# Patient Record
Sex: MALE | Race: WHITE | NOT HISPANIC OR LATINO
[De-identification: names, ages, dates, MRNs, and addresses within clinical notes are randomized per-mention and may not be internally consistent; named-entity substitution may affect disease eponyms.]

---

## 2017-02-06 PROBLEM — Z00.00 ENCOUNTER FOR PREVENTIVE HEALTH EXAMINATION: Status: ACTIVE | Noted: 2017-02-06

## 2018-01-05 ENCOUNTER — APPOINTMENT (OUTPATIENT)
Dept: SPEECH THERAPY | Facility: CLINIC | Age: 83
End: 2018-01-05

## 2018-01-05 ENCOUNTER — OUTPATIENT (OUTPATIENT)
Dept: OUTPATIENT SERVICES | Facility: HOSPITAL | Age: 83
LOS: 1 days | Discharge: HOME | End: 2018-01-05

## 2018-02-13 DIAGNOSIS — H90.3 SENSORINEURAL HEARING LOSS, BILATERAL: ICD-10-CM

## 2018-02-16 ENCOUNTER — APPOINTMENT (OUTPATIENT)
Dept: SPEECH THERAPY | Facility: CLINIC | Age: 83
End: 2018-02-16

## 2018-03-23 ENCOUNTER — APPOINTMENT (OUTPATIENT)
Dept: SPEECH THERAPY | Facility: CLINIC | Age: 83
End: 2018-03-23

## 2019-10-26 ENCOUNTER — EMERGENCY (EMERGENCY)
Facility: HOSPITAL | Age: 84
LOS: 1 days | Discharge: HOME | End: 2019-10-26
Attending: EMERGENCY MEDICINE
Payer: MEDICARE

## 2019-10-26 VITALS
HEIGHT: 67 IN | RESPIRATION RATE: 18 BRPM | SYSTOLIC BLOOD PRESSURE: 166 MMHG | HEART RATE: 66 BPM | DIASTOLIC BLOOD PRESSURE: 78 MMHG | WEIGHT: 151.9 LBS | TEMPERATURE: 98 F | OXYGEN SATURATION: 98 %

## 2019-10-26 PROCEDURE — 30903 CONTROL OF NOSEBLEED: CPT

## 2019-10-26 PROCEDURE — 99282 EMERGENCY DEPT VISIT SF MDM: CPT | Mod: 25

## 2019-10-26 PROCEDURE — 30903 CONTROL OF NOSEBLEED: CPT | Mod: LT

## 2019-10-26 PROCEDURE — 99283 EMERGENCY DEPT VISIT LOW MDM: CPT | Mod: 25

## 2019-10-27 ENCOUNTER — EMERGENCY (EMERGENCY)
Facility: HOSPITAL | Age: 84
LOS: 1 days | Discharge: HOME | End: 2019-10-27
Attending: EMERGENCY MEDICINE
Payer: MEDICARE

## 2019-10-27 VITALS
HEART RATE: 67 BPM | RESPIRATION RATE: 18 BRPM | TEMPERATURE: 98 F | DIASTOLIC BLOOD PRESSURE: 69 MMHG | HEIGHT: 67 IN | SYSTOLIC BLOOD PRESSURE: 141 MMHG | WEIGHT: 154.98 LBS | OXYGEN SATURATION: 97 %

## 2019-10-27 VITALS
DIASTOLIC BLOOD PRESSURE: 65 MMHG | SYSTOLIC BLOOD PRESSURE: 130 MMHG | OXYGEN SATURATION: 98 % | RESPIRATION RATE: 17 BRPM | TEMPERATURE: 97 F | HEART RATE: 64 BPM

## 2019-10-27 VITALS
DIASTOLIC BLOOD PRESSURE: 69 MMHG | OXYGEN SATURATION: 99 % | HEART RATE: 78 BPM | RESPIRATION RATE: 16 BRPM | SYSTOLIC BLOOD PRESSURE: 117 MMHG | TEMPERATURE: 98 F

## 2019-10-27 DIAGNOSIS — R04.0 EPISTAXIS: ICD-10-CM

## 2019-10-27 DIAGNOSIS — Z95.1 PRESENCE OF AORTOCORONARY BYPASS GRAFT: Chronic | ICD-10-CM

## 2019-10-27 LAB
APTT BLD: 29.5 SEC — SIGNIFICANT CHANGE UP (ref 27.5–36.3)
HCT VFR BLD CALC: 42.2 % — SIGNIFICANT CHANGE UP (ref 39–50)
HCT VFR BLD CALC: 44.3 % — SIGNIFICANT CHANGE UP (ref 39–50)
HGB BLD-MCNC: 14.4 G/DL — SIGNIFICANT CHANGE UP (ref 13–17)
HGB BLD-MCNC: 14.5 G/DL — SIGNIFICANT CHANGE UP (ref 13–17)
INR BLD: 0.97 RATIO — SIGNIFICANT CHANGE UP (ref 0.88–1.16)
MCHC RBC-ENTMCNC: 29 PG — SIGNIFICANT CHANGE UP (ref 27–34)
MCHC RBC-ENTMCNC: 30.2 PG — SIGNIFICANT CHANGE UP (ref 27–34)
MCHC RBC-ENTMCNC: 32.7 GM/DL — SIGNIFICANT CHANGE UP (ref 32–36)
MCHC RBC-ENTMCNC: 34.1 GM/DL — SIGNIFICANT CHANGE UP (ref 32–36)
MCV RBC AUTO: 88.5 FL — SIGNIFICANT CHANGE UP (ref 80–100)
MCV RBC AUTO: 88.6 FL — SIGNIFICANT CHANGE UP (ref 80–100)
NRBC # BLD: 0 /100 WBCS — SIGNIFICANT CHANGE UP (ref 0–0)
NRBC # BLD: 0 /100 WBCS — SIGNIFICANT CHANGE UP (ref 0–0)
PLATELET # BLD AUTO: 234 K/UL — SIGNIFICANT CHANGE UP (ref 150–400)
PLATELET # BLD AUTO: 256 K/UL — SIGNIFICANT CHANGE UP (ref 150–400)
PROTHROM AB SERPL-ACNC: 11 SEC — SIGNIFICANT CHANGE UP (ref 10–12.9)
RBC # BLD: 4.77 M/UL — SIGNIFICANT CHANGE UP (ref 4.2–5.8)
RBC # BLD: 5 M/UL — SIGNIFICANT CHANGE UP (ref 4.2–5.8)
RBC # FLD: 13.1 % — SIGNIFICANT CHANGE UP (ref 10.3–14.5)
RBC # FLD: 13.3 % — SIGNIFICANT CHANGE UP (ref 10.3–14.5)
WBC # BLD: 8.31 K/UL — SIGNIFICANT CHANGE UP (ref 3.8–10.5)
WBC # BLD: 9.31 K/UL — SIGNIFICANT CHANGE UP (ref 3.8–10.5)
WBC # FLD AUTO: 8.31 K/UL — SIGNIFICANT CHANGE UP (ref 3.8–10.5)
WBC # FLD AUTO: 9.31 K/UL — SIGNIFICANT CHANGE UP (ref 3.8–10.5)

## 2019-10-27 PROCEDURE — 85730 THROMBOPLASTIN TIME PARTIAL: CPT

## 2019-10-27 PROCEDURE — 85027 COMPLETE CBC AUTOMATED: CPT

## 2019-10-27 PROCEDURE — 30903 CONTROL OF NOSEBLEED: CPT

## 2019-10-27 PROCEDURE — 99284 EMERGENCY DEPT VISIT MOD MDM: CPT | Mod: 25

## 2019-10-27 PROCEDURE — 99284 EMERGENCY DEPT VISIT MOD MDM: CPT | Mod: GC

## 2019-10-27 PROCEDURE — 30901 CONTROL OF NOSEBLEED: CPT

## 2019-10-27 PROCEDURE — 30903 CONTROL OF NOSEBLEED: CPT | Mod: LT

## 2019-10-27 PROCEDURE — 99283 EMERGENCY DEPT VISIT LOW MDM: CPT | Mod: 25

## 2019-10-27 PROCEDURE — 85610 PROTHROMBIN TIME: CPT

## 2019-10-27 PROCEDURE — 99283 EMERGENCY DEPT VISIT LOW MDM: CPT

## 2019-10-27 RX ORDER — CEPHALEXIN 500 MG
1 CAPSULE ORAL
Qty: 20 | Refills: 0
Start: 2019-10-27 | End: 2019-11-05

## 2019-10-27 RX ORDER — CEPHALEXIN 500 MG
500 CAPSULE ORAL ONCE
Refills: 0 | Status: COMPLETED | OUTPATIENT
Start: 2019-10-27 | End: 2019-10-27

## 2019-10-27 RX ADMIN — Medication 500 MILLIGRAM(S): at 12:05

## 2019-10-27 NOTE — ED PROVIDER NOTE - OBJECTIVE STATEMENT
87yo M pmh quadruple bypass on ASA, unspecified arrythmia, HLD, HTN, w annual epistaxes (once requiring cauterization) presents for evaluation of L sided epistaxis intermittently x 2 days. No trauma. No other abnormal bleeding. No black stool.

## 2019-10-27 NOTE — ED PROVIDER NOTE - PATIENT PORTAL LINK FT
You can access the FollowMyHealth Patient Portal offered by [facility] by registering at the following website: [email or web address]. By joining [employer]’s FollowMyHealth portal, you will also be able to view your health information using other applications (apps) compatible with our system.

## 2019-10-27 NOTE — ED PROVIDER NOTE - NSFOLLOWUPCLINICS_GEN_ALL_ED_FT
[facility] - ENT  Otolaryngology (ENT)  [address] [place] [address]  Phone: [phone]  Fax:   Follow Up Time:

## 2019-10-27 NOTE — ED PROVIDER NOTE - NSFOLLOWUPCLINICS_GEN_ALL_ED_FT
[facility] - ENT  Otolaryngology (ENT)  [address] [place] [address]  Phone: [phone]  Fax:   Follow Up Time:

## 2019-10-27 NOTE — CONSULT NOTE ADULT - SUBJECTIVE AND OBJECTIVE BOX
CC: Epistaxis     HPI: 86y M hx CAD on ASA now returning to ED for the third time for epistaxis. Pt packed with 5.5cm anterior last night by the ED, returned this morning and ED removed RR and replaced it with a 7.5 and inflated about 5 cc. and merocel anteriorly. Patient was then discharged. Patient returns to the ED now with bleeding. Merocel no longer in place. ED inflated and additional 1.5cc into the balloon. Patient with a total of now 7.5cc in the balloon. Patient was reinforced with Surgicel anteriorly with some minor oozing. Denies dripping in the back of the throat, SOB      PAST MEDICAL & SURGICAL HISTORY:  Nosebleed  Prostate CA  HLD (hyperlipidemia)  HTN (hypertension)  S/P CABG (coronary artery bypass graft)    Allergies    No Known Allergies    Intolerances      MEDICATIONS  (STANDING):    MEDICATIONS  (PRN):      Social History: no tobacco, no etoh       Family history: Pt denies any sign FHx    ROS:   ENT: all negative except as noted in HPI   CV: denies palpitations  Pulm: denies SOB, cough, hemoptysis  GI: denies change in apetite, indigestion, n/v  : denies pertinent urinary symptoms, urgency  Neuro: denies numbness/tingling, loss of sensation  Psych: denies anxiety  MS: denies muscle weakness, instability  Heme: denies easy bruising or bleeding  Endo: denies heat/cold intolerance, excessive sweating  Vascular: denies LE edema    Vital Signs Last 24 Hrs  T(C): 36.3 (27 Oct 2019 21:12), Max: 36.8 (27 Oct 2019 20:38)  T(F): 97.3 (27 Oct 2019 21:12), Max: 98.3 (27 Oct 2019 20:38)  HR: 64 (27 Oct 2019 21:12) (64 - 78)  BP: 130/65 (27 Oct 2019 21:12) (117/69 - 166/78)  BP(mean): --  RR: 17 (27 Oct 2019 21:12) (16 - 18)  SpO2: 98% (27 Oct 2019 21:12) (97% - 99%)                          14.4   8.31  )-----------( 234      ( 27 Oct 2019 12:06 )             42.2         PHYSICAL EXAM:  Gen: NAD  Skin: No rashes, bruises, or lesions  Head: Normocephalic, Atraumatic  Face: no edema, erythema, or fluctuance. Parotid glands soft without mass  Eyes: no scleral injection  Nose: Left nose with Rapid Rhino inflated with 7.5 cc of air, reinforced with surgicel   Mouth: No Stridor / Drooling / Trismus.  Mucosa moist, tongue/uvula midline, oropharynx clear  Neck: Flat, supple, no lymphadenopathy, trachea midline, no masses  Lymphatic: No lymphadenopathy  Resp: breathing easily, no stridor  CV: no peripheral edema/cyanosis  GI: nondistended   Peripheral vascular: no JVD or edema  Neuro: facial nerve intact, no facial droop

## 2019-10-27 NOTE — ED PROVIDER NOTE - OBJECTIVE STATEMENT
86y M hx CAD on ASA here for bleeding through rhino rocket packing which was placed yesterday. Pt packed with 5.5cm anterior but states that he has had bleeding both anteriorly and posteriorly through the night. No other complaints. No dizziness, cp, palp, sob, fuller, nv. 86y M hx CAD on ASA here for bleeding through rhino rocket packing which was placed early this morning. Pt packed with 5.5cm anterior but states that he has had bleeding both anteriorly and posteriorly through the night. No other complaints. No dizziness, cp, palp, sob, fuller, nv.

## 2019-10-27 NOTE — ED PROVIDER NOTE - PROGRESS NOTE DETAILS
Allen Allen, PGY-2: patient does not want to stay, no active bleeding at this time. Hemoglobin is stable, has information for ENT follow up from previous visits. Will dischage.

## 2019-10-27 NOTE — CONSULT NOTE ADULT - PROBLEM SELECTOR RECOMMENDATION 9
- CDU for observation   - gram-positive abx coverage for duration of packing placement  - Continue with Nasal Packing (Rapid Rhino)  - Strict blood pressure control.  - Nasal saline, 2 sprays to both nares 4 times a day  - Avoid nasal trauma; no nose rubbing, blowing or manipulating nasal packing.  Sneeze with mouth open and pinching nares.  - Avoid bending with head below the waist.    - No heavy lifting.

## 2019-10-27 NOTE — ED PROVIDER NOTE - NSFOLLOWUPINSTRUCTIONS_ED_ALL_ED_FT
Please follow up with ENT, call on Monday to schedule an appointment as soon as possible.    Return to the emergency department if you worsening bleeding, fatigue, dizziness, shortness of breath or chest pain.    Leave the nasal packing in place until removed by ENT.

## 2019-10-27 NOTE — ED ADULT NURSE NOTE - OBJECTIVE STATEMENT
87 y/o male taking aspirin bib son with c/o epistaxis 85 y/o male with pmhx of recurrent epistaxis taking aspirin bib son with c/o epistaxis.  no airway compromise noted.  no sob or respiratory distress.  vss.  safety precautions in place.  son at bedside.

## 2019-10-27 NOTE — ED PROVIDER NOTE - PATIENT PORTAL LINK FT
You can access the FollowMyHealth Patient Portal offered by [facility] by registering at the following website: [email or web address]. By joining [employer]’s FollowMyHealth portal, you will also be able to view your health information using other applications (apps) compatible with our system.

## 2019-10-27 NOTE — ED PROVIDER NOTE - CARE PLAN
Assessment and plan of treatment:	Epistaxis  -anterior nasal packin  -f/u w ENT in 2 days Principal Discharge DX:	Nosebleed  Assessment and plan of treatment:	Epistaxis  -anterior nasal packin  -f/u w ENT in 2 days

## 2019-10-27 NOTE — ED ADULT NURSE NOTE - NSIMPLEMENTINTERV_GEN_ALL_ED
Implemented All Fall with Harm Risk Interventions:  [place] to call system. Call bell, personal items and telephone within reach. Instruct patient to call for assistance. Room bathroom lighting operational. Non-slip footwear when patient is off stretcher. Physically safe environment: no spills, clutter or unnecessary equipment. Stretcher in lowest position, wheels locked, appropriate side rails in place. Provide visual cue, wrist band, yellow gown, etc. Monitor gait and stability. Monitor for mental status changes and reorient to person, place, and time. Review medications for side effects contributing to fall risk. Reinforce activity limits and safety measures with patient and family. Provide visual clues: red socks.

## 2019-10-27 NOTE — ED PROVIDER NOTE - PHYSICAL EXAMINATION
Gen: WNWD NAD  HEENT: NCAT  EOMI normal pharynx L nare packed w rhino, rhino removed and bleeding from L nare, unable to visualize source   Neck: supple  CV: RRR, no murmur  Lung: CTA BL  Ext: wwp, coulter   Neuro: Awake alert CN grossly intact

## 2019-10-27 NOTE — ED PROVIDER NOTE - OBJECTIVE STATEMENT
86y male with PMH of CAD s/p CABG, HTN presenting with a nosebleed. This is his third visit today, has a 7.5 rhinorocket in place inflated with 5cc. Bleeding around packing started around 7pm, briefly resolved, but became worse when in triage. No chest pain, 86y male with PMH of CAD s/p CABG, HTN presenting with a nosebleed. This is his third visit today, has a 7.5 rhinorocket in place inflated with 5cc. Bleeding around packing started around 7pm, briefly resolved, but became worse when in triage. No chest pain, dizziness, SOB, weakness, fatigue.

## 2019-10-27 NOTE — CONSULT NOTE ADULT - ASSESSMENT
86y M hx CAD on ASA now returning to ED for the third time for epistaxis. Currently with a posterior 7.5 rapid rhino inflated with 7.5cc of air reinforced with surgicel. Currently not actively bleeding. Since this is the third time patient is coming to the ED we will recommend overnight observation.

## 2019-10-27 NOTE — CONSULT NOTE ADULT - SUBJECTIVE AND OBJECTIVE BOX
CC: L epistaxis     HPI:   86y M hx CAD on ASA here for bleeding through rhino rocket packing which was placed early this morning. Pt packed with 5.5cm anterior but states that he has had bleeding both anteriorly and posteriorly through the night. No other complaints. No dizziness, cp, palp, sob, fuller, nv. ED removed RR and replaced with 7.5 and inflated about 5 cc. and merocel anterior, no signs of active bleeding at time of seeing pt.     PAST MEDICAL & SURGICAL HISTORY:  HLD (hyperlipidemia)  HTN (hypertension)  S/P CABG (coronary artery bypass graft)    Allergies    No Known Allergies    Intolerances      MEDICATIONS  (STANDING):    MEDICATIONS  (PRN):      Social History: no tobacco, no etoh     Family history: Pt denies any sign FHx    ROS:   ENT: all negative except as noted in HPI   CV: denies palpitations  Pulm: denies SOB, cough, hemoptysis  GI: denies change in apetite, indigestion, n/v  : denies pertinent urinary symptoms, urgency  Neuro: denies numbness/tingling, loss of sensation  Psych: denies anxiety  MS: denies muscle weakness, instability  Heme: denies easy bruising or bleeding  Endo: denies heat/cold intolerance, excessive sweating  Vascular: denies LE edema    Vital Signs Last 24 Hrs  T(C): 36.5 (27 Oct 2019 10:29), Max: 36.5 (26 Oct 2019 23:24)  T(F): 97.7 (27 Oct 2019 10:29), Max: 97.7 (26 Oct 2019 23:24)  HR: 78 (27 Oct 2019 10:29) (66 - 78)  BP: 117/69 (27 Oct 2019 10:29) (117/69 - 166/78)  BP(mean): --  RR: 16 (27 Oct 2019 10:29) (16 - 18)  SpO2: 99% (27 Oct 2019 10:29) (98% - 99%)                          14.4   8.31  )-----------( 234      ( 27 Oct 2019 12:06 )             42.2             PHYSICAL EXAM:  Gen: NAD  Skin: No rashes, bruises, or lesions  Head: Normocephalic, Atraumatic  Face: no edema, erythema, or fluctuance. Parotid glands soft without mass  Eyes: no scleral injection  Nose: Left nare with 7.5 RR w 5cc of saline, and folder merocel anteriorly, which was removed and replaced with nasopore packin, no active bleeding.   Mouth: No Stridor / Drooling / Trismus.  Mucosa moist, tongue/uvula midline, oropharynx clear, no signs of bleeding  Neck: Flat, supple, no lymphadenopathy, trachea midline, no masses  Lymphatic: No lymphadenopathy  Resp: breathing easily, no stridor  CV: no peripheral edema/cyanosis  GI: nondistended   Peripheral vascular: no JVD or edema  Neuro: facial nerve intact, no facial droop          IMAGING/ADDITIONAL STUDIES:

## 2019-10-27 NOTE — CONSULT NOTE ADULT - PROBLEM SELECTOR RECOMMENDATION 9
- gram-positive abx coverage for duration of packing placement  - Continue with Nasal Packing (Rapid Rhino)  - Strict blood pressure control.  - Nasal saline, 2 sprays to both nares 4 times a day  - Avoid nasal trauma; no nose rubbing, blowing or manipulating nasal packing.  Sneeze with mouth open and pinching nares.  - Avoid bending with head blow the waist.    -No heavy lifting.

## 2019-10-27 NOTE — ED PROVIDER NOTE - NSFOLLOWUPINSTRUCTIONS_ED_ALL_ED_FT
You were seen in the Emergency Department for nose bleed.  1) Advance activity as tolerated.    2) Continue all previously prescribed medications as directed.    3) Please follow up with ENT this week. I have attached the contact above. You should also receive a call from our follow up coordinator within the next 2 days to help schedule your appointment.  4) Return to the Emergency Department for worsening or persistent symptoms including rebleeding, chills, fever, and/or ANY NEW OR CONCERNING SYMPTOMS. If you have issues obtaining follow up, please call: [phone] to obtain a doctor or specialist who takes your insurance in your area.

## 2019-10-27 NOTE — ED ADULT NURSE NOTE - NSIMPLEMENTINTERV_GEN_ALL_ED
Implemented All Fall with Harm Risk Interventions:  [place] to call system. Call bell, personal items and telephone within reach. Instruct patient to call for assistance. Room bathroom lighting operational. Non-slip footwear when patient is off stretcher. Physically safe environment: no spills, clutter or unnecessary equipment. Stretcher in lowest position, wheels locked, appropriate side rails in place. Provide visual cue, wrist band, yellow gown, etc. Monitor gait and stability. Monitor for mental status changes and reorient to person, place, and time. Review medications for side effects contributing to fall risk. Reinforce activity limits and safety measures with patient and family. Provide visual clues: red socks.

## 2019-10-27 NOTE — ED PROVIDER NOTE - PROGRESS NOTE DETAILS
Seen by ENT, nasopore placed anteriorly, hemostasis achieved. Obs x2 hours. , Joseph Frankel PGY1: Patient no longer bleeding. Observation complete. Will dc ENT with follow up. Discussed plan and return precautions with patient and family who understand and agree.

## 2019-10-27 NOTE — ED PROVIDER NOTE - CARE PROVIDER_API CALL
Dayo Lange)  Otolaryngology  [address] [place] [address], [address]  [place] [address]  Phone: [phone]  Fax: [phone]  Follow Up Time:

## 2019-10-27 NOTE — ED PROVIDER NOTE - ATTENDING CONTRIBUTION TO CARE
Patient presenting with epistaxis.  Now 3rd visit for same.  Seen by myself last night, bleeding from L nare, on baby ASA.  5.5cm rhino placed and inflated with control of bleeding.  Had recurrent oozing around rhino, presented earlier this morning, ENT evaluated, CBC unremarkable, 5.5cm changed to 7.5cm, observed in Emergency Department for 2 hours with no bleeding and discharged.  Returning tonight for recurrent oozing around rhino with some bleeding down back of throat.  No other new symptoms.    Exam:  General: Patient well appearing, vital signs within normal limits  HEENT: airway patent with moist mucous membranes, L nare rhino rocket in place without active bleeding around rhino or in oropharynx  Cardiac: RRR S1/S2 with strong peripheral pulses  Respiratory: lungs clear without respiratory distress  Neuro: no gross neurologic deficits  Skin: warm, well perfused  Psych: normal mood and affect    1.5 cc of air added to rhino for increased pressure, no active bleeding appreciated while in Emergency Department.  ENT consulted in Emergency Department, recommending CDU for obs given patients recurring small bleeding for continued management as needed.  Labs without change in hemoglobin and normal coags.  CDU evaluated and accepted patient, however patient unwilling to stay for observation, reporting will follow up with ENT as outpatient.  Will discharge given patient unwilling to stay for observation, however suspect high likelihood of repeat Emergency Department visit for same give course to date.

## 2019-10-27 NOTE — ED PROVIDER NOTE - PROGRESS NOTE DETAILS
Anterior packing placed by Dr Jaleel earl hemostasis. Plan to fmonet earl ENT in 2 days. Plan for discharge discussed with patient. All questions answered. Pt verbalizes agreement and understanding of plan. -Opal Neves PA-C

## 2019-10-27 NOTE — ED PROVIDER NOTE - CLINICAL SUMMARY MEDICAL DECISION MAKING FREE TEXT BOX
86y M on ASA here for recurrent epistaxis despite packing yesterday. Will remove current packing and repack, if hemostasis is not achieved will consult ENT. Check CBC given signif bleeding.

## 2019-10-27 NOTE — ED PROVIDER NOTE - ATTENDING CONTRIBUTION TO CARE
Patient presenting with nose bleed.  Began earlier this evening.  Unable to control at home.  On aspirin, no other blood thinners.  History of occasional nose bleeds in past.  No injuries.    Exam:  General: Patient well appearing, mildly hypertensive otherwise vital signs within normal limits  HEENT: bleeding with clot present in L nare  Neuro: no gross neurologic deficits  Skin: warm, well perfused  Psych: normal mood and affect    Clot evacuated, unable to directly visualize source of bleeding, rhinorocket placed with immediate control, will monitor in Emergency Department for further bleeding and likely DC with ENT follow up with rhino in place.

## 2019-10-27 NOTE — CONSULT NOTE ADULT - ASSESSMENT
86y with Left epistaxis controled with 7.5 RR and nasopore packing anteriorly, no bleeding in posterior pharynx.

## 2019-10-27 NOTE — ED PROVIDER NOTE - CLINICAL SUMMARY MEDICAL DECISION MAKING FREE TEXT BOX
80y male with epistaxis. Refractory to treatment with rhino rocket. Does not appear to be actively bleeding a this time. Will consult ENT, will call CDU for obs.

## 2019-10-27 NOTE — ED PROVIDER NOTE - PATIENT PORTAL LINK FT
You can access the FollowMyHealth Patient Portal offered by [facility] by registering at the following website: [email or web address]. By joining [employer]’s FollowMyHealth portal, you will also be able to view your health information using other applications (apps) compatible with our system.

## 2019-10-28 ENCOUNTER — EMERGENCY (EMERGENCY)
Facility: HOSPITAL | Age: 84
LOS: 1 days | Discharge: HOME | End: 2019-10-28
Attending: PERSONAL EMERGENCY RESPONSE ATTENDANT
Payer: MEDICARE

## 2019-10-28 VITALS
RESPIRATION RATE: 17 BRPM | HEART RATE: 76 BPM | OXYGEN SATURATION: 96 % | SYSTOLIC BLOOD PRESSURE: 104 MMHG | DIASTOLIC BLOOD PRESSURE: 69 MMHG | TEMPERATURE: 98 F

## 2019-10-28 VITALS
HEART RATE: 77 BPM | OXYGEN SATURATION: 98 % | TEMPERATURE: 98 F | WEIGHT: 154.98 LBS | HEIGHT: 67 IN | SYSTOLIC BLOOD PRESSURE: 145 MMHG | DIASTOLIC BLOOD PRESSURE: 76 MMHG | RESPIRATION RATE: 18 BRPM

## 2019-10-28 DIAGNOSIS — R04.0 EPISTAXIS: ICD-10-CM

## 2019-10-28 DIAGNOSIS — Z95.1 PRESENCE OF AORTOCORONARY BYPASS GRAFT: Chronic | ICD-10-CM

## 2019-10-28 PROBLEM — I10 ESSENTIAL (PRIMARY) HYPERTENSION: Chronic | Status: ACTIVE | Noted: 2019-10-27

## 2019-10-28 PROBLEM — E78.5 HYPERLIPIDEMIA, UNSPECIFIED: Chronic | Status: ACTIVE | Noted: 2019-10-27

## 2019-10-28 LAB
HCT VFR BLD CALC: 43.8 % — SIGNIFICANT CHANGE UP (ref 39–50)
HGB BLD-MCNC: 14.7 G/DL — SIGNIFICANT CHANGE UP (ref 13–17)
MCHC RBC-ENTMCNC: 29.3 PG — SIGNIFICANT CHANGE UP (ref 27–34)
MCHC RBC-ENTMCNC: 33.6 GM/DL — SIGNIFICANT CHANGE UP (ref 32–36)
MCV RBC AUTO: 87.3 FL — SIGNIFICANT CHANGE UP (ref 80–100)
NRBC # BLD: 0 /100 WBCS — SIGNIFICANT CHANGE UP (ref 0–0)
PLATELET # BLD AUTO: 244 K/UL — SIGNIFICANT CHANGE UP (ref 150–400)
RBC # BLD: 5.02 M/UL — SIGNIFICANT CHANGE UP (ref 4.2–5.8)
RBC # FLD: 13.2 % — SIGNIFICANT CHANGE UP (ref 10.3–14.5)
WBC # BLD: 11.38 K/UL — HIGH (ref 3.8–10.5)
WBC # FLD AUTO: 11.38 K/UL — HIGH (ref 3.8–10.5)

## 2019-10-28 PROCEDURE — 30903 CONTROL OF NOSEBLEED: CPT | Mod: LT

## 2019-10-28 PROCEDURE — G0378: CPT

## 2019-10-28 PROCEDURE — 99234 HOSP IP/OBS SM DT SF/LOW 45: CPT | Mod: GC

## 2019-10-28 PROCEDURE — 85027 COMPLETE CBC AUTOMATED: CPT

## 2019-10-28 PROCEDURE — 99284 EMERGENCY DEPT VISIT MOD MDM: CPT | Mod: 25

## 2019-10-28 PROCEDURE — 96374 THER/PROPH/DIAG INJ IV PUSH: CPT | Mod: XU

## 2019-10-28 PROCEDURE — 30901 CONTROL OF NOSEBLEED: CPT | Mod: LT

## 2019-10-28 RX ORDER — MORPHINE SULFATE 50 MG/1
4 CAPSULE, EXTENDED RELEASE ORAL ONCE
Refills: 0 | Status: DISCONTINUED | OUTPATIENT
Start: 2019-10-28 | End: 2019-10-28

## 2019-10-28 RX ORDER — AMLODIPINE BESYLATE 2.5 MG/1
5 TABLET ORAL ONCE
Refills: 0 | Status: COMPLETED | OUTPATIENT
Start: 2019-10-28 | End: 2019-10-28

## 2019-10-28 RX ORDER — METOPROLOL TARTRATE 50 MG
25 TABLET ORAL DAILY
Refills: 0 | Status: DISCONTINUED | OUTPATIENT
Start: 2019-10-28 | End: 2019-11-04

## 2019-10-28 RX ORDER — LISINOPRIL 2.5 MG/1
40 TABLET ORAL DAILY
Refills: 0 | Status: DISCONTINUED | OUTPATIENT
Start: 2019-10-28 | End: 2019-11-04

## 2019-10-28 RX ORDER — LEVOTHYROXINE SODIUM 125 MCG
125 TABLET ORAL DAILY
Refills: 0 | Status: DISCONTINUED | OUTPATIENT
Start: 2019-10-28 | End: 2019-11-04

## 2019-10-28 RX ORDER — MORPHINE SULFATE 50 MG/1
2 CAPSULE, EXTENDED RELEASE ORAL ONCE
Refills: 0 | Status: DISCONTINUED | OUTPATIENT
Start: 2019-10-28 | End: 2019-10-28

## 2019-10-28 RX ORDER — SODIUM CHLORIDE 9 MG/ML
3 INJECTION INTRAMUSCULAR; INTRAVENOUS; SUBCUTANEOUS EVERY 8 HOURS
Refills: 0 | Status: DISCONTINUED | OUTPATIENT
Start: 2019-10-28 | End: 2019-11-04

## 2019-10-28 RX ADMIN — SODIUM CHLORIDE 3 MILLILITER(S): 9 INJECTION INTRAMUSCULAR; INTRAVENOUS; SUBCUTANEOUS at 16:27

## 2019-10-28 RX ADMIN — MORPHINE SULFATE 2 MILLIGRAM(S): 50 CAPSULE, EXTENDED RELEASE ORAL at 13:25

## 2019-10-28 RX ADMIN — MORPHINE SULFATE 2 MILLIGRAM(S): 50 CAPSULE, EXTENDED RELEASE ORAL at 12:53

## 2019-10-28 NOTE — ED CDU PROVIDER DISPOSITION NOTE - PATIENT PORTAL LINK FT
You can access the FollowMyHealth Patient Portal offered by [facility] by registering at the following website: [email or web address]. By joining [employer]’s FollowMyHealth portal, you will also be able to view your health information using other applications (apps) compatible with our system.

## 2019-10-28 NOTE — ED CDU PROVIDER INITIAL DAY NOTE - OBJECTIVE STATEMENT
86y male with PMH of CAD s/p CABG, HTN presenting with a nosebleed. This is his fourth visit today, has a 7.5 rhinorocket in place inflated with 7cc. Bleeding around packing started around 2:30am, bleeding has stopped now. No chest pain, dizziness, SOB, weakness, fatigue. On daily asa 81.  In ED ENT evaluated and placed rhinorocket again. Discussed with ENT plan for reassessing rhinorocket frequently throughout CDU visit, as H/H stable at this time, patient not requiring admission.

## 2019-10-28 NOTE — ED PROVIDER NOTE - PHYSICAL EXAMINATION
Head: NCAT  HEENT: Dried blood on nose and chin, no blood seeping around RR in left nares, clotted blood in the left nares, no blood in right nares, mild blood streak in oropharynx, oral mucosa moist, normal conjunctiva  Lung: CTAB, no respiratory distress, no wheezes/rhonchi/rales B/L, speaking in full sentences  CV: RRR, no murmurs, rubs or gallops	  Skin: Warm, well perfused, no rash  Psych: normal affect.   ~Allen Tiffany PGY2

## 2019-10-28 NOTE — ED CDU PROVIDER DISPOSITION NOTE - NSFOLLOWUPINSTRUCTIONS_ED_ALL_ED_FT
Follow up with ENT out patient on 10/30 for packing removal at [address] [place] [address] call [phone] (Either Mike Arellano or Emerson)  Avoid nasal trauma; no nose rubbing, blowing or manipulating nasal packing.  Sneeze with mouth open and pinching nares. Avoid bending with head blow the waist. No heavy lifting.  Follow up with your Primary Care Physician within the next 2-3 days  Bring a copy of your test results with you to your appointment  Continue your current home medication regimen  Return to the Emergency Room if you experience new or worsening symptoms

## 2019-10-28 NOTE — ED ADULT NURSE REASSESSMENT NOTE - NS ED NURSE REASSESS COMMENT FT1
Pt discharged as per provider. Teaching provided by AIMEE Muniz. Pt verbalizes understanding to discharge orders & will follow up with PMD post discharge. IV lock removed. No bleeding noted. Pt stable upon discharge.

## 2019-10-28 NOTE — CONSULT NOTE ADULT - ATTENDING COMMENTS
patient would like to go home, has been observed for 12 hours with no further rebleeding.  F/up on thursday or friday for packing removal.

## 2019-10-28 NOTE — ED ADULT NURSE NOTE - OBJECTIVE STATEMENT
87 yo male from home A&OX4 daughter bedside c/o epitasis. Pt seen last night and DC with rhino rocket. PT st he is bleeding around device. Pt has small amount of dry blood under nostril, not actively bleeding. PT st he has small arterial bleed. Pt denies headache, lightheaded/dizziness. Pt denies all other complaints. VS stable, MD bedside for exam, CBC pending. ENT consult pending. 85 yo male from home A&OX4 daughter bedside c/o epistaxis. Pt seen last night and DC with rhino rocket. PT st he is bleeding around device. Pt has small amount of dry blood under nostril, not actively bleeding. PT st he has small arterial bleed. Pt denies headache, lightheaded/dizziness. Pt denies all other complaints. VS stable, MD bedside for exam, CBC pending. ENT consult pending.

## 2019-10-28 NOTE — ED CDU PROVIDER INITIAL DAY NOTE - DETAILS
87 y/o M p/w epistaxis   - continuous monitoring and frequent reevaluations   - observe for rebleeding   - ENT following   - d/w Dr. Reynolds

## 2019-10-28 NOTE — ED PROVIDER NOTE - ATTENDING CONTRIBUTION TO CARE
Attending MD Willoughby.  Agree with above.  Pt with recurrent visits however limited bleeding on exams.  Pt denies any secondary sxs - SOB, light-headedness, CP.  He had been offered observation stay on last visit but refused.  Rhinorocket in place, no active bleeding appreciated.  ENT aware of pt given repeat visits.  Had recommended no scoping/cauterization that might destabilize clot.  ENT reconsulted for pt comfort to reassure and reiterate that small volume oozing is not indication for concern.  Pt to be observed in Ed and ENT to see with likely plan to d/c if bleeding does not significantly recur.  Stable at time of signout to incoming team pending above.

## 2019-10-28 NOTE — ED CDU PROVIDER DISPOSITION NOTE - CARE PROVIDER_API CALL
Tierra Zamudio)  Otolaryngology  [address] [place] [address], [address]  [place] [address]  Phone: [phone]  Fax: [phone]  Follow Up Time:

## 2019-10-28 NOTE — ED ADULT NURSE REASSESSMENT NOTE - NS ED NURSE REASSESS COMMENT FT1
Pt is in bed, family at bedside. Physician Assistant is repacking left nostril. Several bloody gauze present. Will assess and monitor.

## 2019-10-28 NOTE — CONSULT NOTE ADULT - PROBLEM SELECTOR RECOMMENDATION 9
Observation for at least 12 hours  Pt to follow up with ENT out patient on 10/30 for packing removal at [facility] call [phone] (Either Mike Arellano or Emerson)  - gram-positive abx coverage for duration of packing placement  - Continue with Nasal Packing (Rapid Rhino)  - Strict blood pressure control.  - Nasal saline, 2 sprays to both nares 4 times a day  - Avoid nasal trauma; no nose rubbing, blowing or manipulating nasal packing.  Sneeze with mouth open and pinching nares.  - Avoid bending with head blow the waist.    -No heavy lifting.

## 2019-10-28 NOTE — CONSULT NOTE ADULT - SUBJECTIVE AND OBJECTIVE BOX
CC: Epistaxis    HPI: 86y M hx CAD on ASA returning to ED for epistaxis. Pt packed with 5.5cm anterior Saturday night by the ED, returned yesterday morning and ED removed RR and replaced it with a 7.5 and inflated about 5 cc. and merocel anteriorly. Patient was then discharged. Patient returned to the ED last night with bleeding. Merocel no longer in place. ED inflated an additional 1.5cc into the balloon. Patient was reinforced with Surgicel anteriorly with some minor oozing. Denies dripping in the back of the throat, SOB. Pt then went home and reported bleeding which started at about 2:30  this am. Pt still with 7.5 RR in L nare.  Pt with history of nosebeleed requiring packing and cauterization 15 years ago, had several minor self resolving bleeds since. Pt denies nasal trauma, headache, facial pain. HH 14.7/43.8 which has been stable.      PAST MEDICAL & SURGICAL HISTORY:  Nosebleed  Prostate CA  HLD (hyperlipidemia)  HTN (hypertension)  S/P CABG (coronary artery bypass graft)    Allergies    No Known Allergies    Intolerances      MEDICATIONS  (STANDING):  levothyroxine 125 MICROGram(s) Oral daily  lisinopril 40 milliGRAM(s) Oral daily  metoprolol succinate ER 25 milliGRAM(s) Oral daily  sodium chloride 0.9% lock flush 3 milliLiter(s) IV Push every 8 hours    MEDICATIONS  (PRN):      Social History: No reported toxic habits    Family history: No significant medical history in first degree relatives      ROS:   ENT: all negative except as noted in HPI   Skin: No itching, dryness, rash, changes to hair, or skin masses  CV: denies palpitations  Pulm: denies SOB, cough, hemoptysis  GI: denies change in appetite, indigestion, n/v  : denies pertinent urinary symptoms, urgency  Neuro: denies numbness/tingling, loss of sensation  Psych: denies anxiety  MS: denies muscle weakness, instability  Heme: denies easy bruising or bleeding  Endo: denies heat/cold intolerance, excessive sweating  Vascular: denies LE edema    Vital Signs Last 24 Hrs  T(C): 36.7 (28 Oct 2019 17:25), Max: 37 (28 Oct 2019 08:36)  T(F): 98.1 (28 Oct 2019 17:25), Max: 98.6 (28 Oct 2019 08:36)  HR: 76 (28 Oct 2019 17:25) (64 - 77)  BP: 104/69 (28 Oct 2019 17:25) (104/69 - 164/82)  BP(mean): --  RR: 17 (28 Oct 2019 17:25) (17 - 19)  SpO2: 96% (28 Oct 2019 17:25) (96% - 99%)                          14.7   11.38 )-----------( 244      ( 28 Oct 2019 08:20 )             43.8         PT/INR - ( 27 Oct 2019 22:23 )   PT: 11.0 sec;   INR: 0.97 ratio         PTT - ( 27 Oct 2019 22:23 )  PTT:29.5 sec    PHYSICAL EXAM:  Gen: NAD  Skin: No rashes, bruises, or lesions  Head: Normocephalic, Atraumatic  Face: no edema, erythema, or fluctuance. Parotid glands soft without mass  Eyes: no scleral injection  Nose: L nare with posterior rapid rhino, initially seen with active oozing of BRB  Mouth: No Stridor / Drooling / Trismus.  Mucosa moist, tongue/uvula midline, oropharynx with fresh blood streaking. No clots  Neck: Flat, supple, no lymphadenopathy, trachea midline, no masses  Lymphatic: No lymphadenopathy  Resp: breathing easily, no stridor  CV: no peripheral edema/cyanosis  GI: nondistended   Peripheral vascular: no JVD or edema  Neuro: facial nerve intact, no facial droop      Initial encounter around 8 am: Pt with brisk bleed despite packing. RR was deflated completely and moved slightly forward. L RR then deflated with about 12 ccs of air and packed anteriorly to hold pressure until bleed resolved. Oropharynx examined. Noted to have stagnant fresh blood which was suctioned. Pt then gargled numerous times until saliva clear and no active bleeding seen on reexamination.     About 2 hours later: Pt examined. No bleeding from b/l nares. Oropharynx clear.    Around 2pm: A few ccs of air taken from packing to make pt more comfortable. Anterior vaseline gauze hanging. Small portion snipped, the rest packed to reinforce anterior nare. Again oropharynx clear of blood.

## 2019-10-28 NOTE — CONSULT NOTE ADULT - ASSESSMENT
85 yo male w intermittent epistaxis from left nare for the past 2 days which has been slowly controlled, most presently with a 7.5 RR and anterior vaseline gauze. Pt has been followed up every about 2 hours by ENT in observation today where he has not had any bleeding anteriorly or posteriorly since the packing manipulation in the ED this am. HH has been stable.

## 2019-10-28 NOTE — ED CDU PROVIDER INITIAL DAY NOTE - PROGRESS NOTE DETAILS
ENT came to reevaluate patient; deflated rhinorocket balloon slightly and patient reports no difficulty swallowing at this time. No evidence of rebleeding again. Will come to observe again later this afternoon. -Brandy Velasco PA-C Patient has not rebled. Discussed with ENT who states will come back to reassess with attending Dr. Mckeon around 7pm. -Brandy Velasco PA-C CDU PROGRESS NOTE PA DEMETRICE: Pt resting comfortably, feeling well without complaint. NAD, VSS.  Rhinorocket in place. While in CDU, patient with no bleeding episodes, without complaints. Pt re-evaluated by ENT and cleared for dispo, advised to follow up 10/30 for packing removal and continue Keflex until removal. c/d/w Dr. Chel MD aware.

## 2019-10-28 NOTE — ED CDU PROVIDER INITIAL DAY NOTE - MEDICAL DECISION MAKING DETAILS
Attending MD Reynolds: 86y male with PMH of CAD s/p CABG, HTN presenting with a nosebleed. This is his fourth visit today.In ED ENT evaluated and placed rhinorocket again. Discussed with ENT plan for reassessing rhinorocket frequently throughout CDU visit, as H/H stable at this time, patient not requiring admission.  Will continue to monitor in CDU.

## 2019-10-28 NOTE — ED CDU PROVIDER INITIAL DAY NOTE - ATTENDING CONTRIBUTION TO CARE
Attending MD Reynolds:   I personally have seen and examined this patient.  Physician assistant note reviewed and agree on plan of care and except where noted.

## 2019-10-28 NOTE — ED CDU PROVIDER DISPOSITION NOTE - CLINICAL COURSE
86y male with PMH of CAD s/p CABG, HTN presenting with a nosebleed. This is his fourth visit today, has a 7.5 rhinorocket in place inflated with 7cc. Bleeding around packing started around 2:30am, bleeding has stopped now. No chest pain, dizziness, SOB, weakness, fatigue. On daily asa 81.  In ED ENT evaluated and placed rhinorocket again. Discussed with ENT plan for reassessing rhinorocket frequently throughout CDU visit, as H/H stable at this time, patient not requiring admission. 86y male with PMH of CAD s/p CABG, HTN presenting with a nosebleed. This is his fourth visit today, has a 7.5 rhinorocket in place inflated with 7cc. Bleeding around packing started around 2:30am, bleeding has stopped now. No chest pain, dizziness, SOB, weakness, fatigue. On daily asa 81.  In ED ENT evaluated and placed rhinorocket again. Discussed with ENT plan for reassessing rhinorocket frequently throughout CDU visit, as H/H stable at this time, patient not requiring admission. While in CDU, patient with no bleeding episodes, without complaints. Pt re-evaluated by ENT and cleared for dispo, advised to follow up 10/30 for packing removal and continue Keflex 500mg BID until removal. c/d/w Dr. Chel MD aware.

## 2019-10-28 NOTE — ED PROVIDER NOTE - CLINICAL SUMMARY MEDICAL DECISION MAKING FREE TEXT BOX
Altaf PGY-1: Patient here with reccurrent Altaf PGY-1: Patient here with recurrent nose bleed after frequent attempts at re-packing by ENT with discharge home. Patient registered to ED four time in past day. Patient on ASA. Hemodynamically stable. Consult ENT.

## 2019-10-28 NOTE — ED CDU PROVIDER DISPOSITION NOTE - ATTENDING CONTRIBUTION TO CARE
I have personally performed a face to face diagnostic evaluation on this patient.  I have reviewed the ACP note and agree with the history, exam, and plan of care, except as noted. 86 year old male with epistaxis.  rhinorocket placed. ENT evaluated and reassessed. h/h stable. patient to f/u outpatient.

## 2019-10-28 NOTE — ED CDU PROVIDER INITIAL DAY NOTE - PHYSICAL EXAMINATION
GEN: Well Appearing, Nontoxic, NAD  HEENT: NC/AT, Symm Facies. PERRL, EOMI, MMM, Dried blood on nose and chin, minimal blood seeping around RR in left nare; no blood in right nares, mild blood streak in oropharynx, oral mucosa moist  CV: No JVD/Bruits or stridor;  +S1S2, RRR w/o m/g/r  RESP: CTAB w/o w/r/r  ABD: Soft, nt/nd, +BS. No guarding/rebound. No RUQ tender, no CVAT  EXT/MSK: No lower extremity edema or calf tenderness. WWP, palpable pulses. FROMx4  SKIN: No erythema, lesions or rash  Neuro: Grossly intact, AOX3 with normal speech, CN II-XII intact; Sensation intact, motor 5/5 throughout. Gait normal

## 2019-10-28 NOTE — ED PROVIDER NOTE - OBJECTIVE STATEMENT
86y male with PMH of CAD s/p CABG, HTN presenting with a nosebleed. This is his fourth visit today, has a 7.5 rhinorocket in place inflated with 7cc. Bleeding around packing started around 2:30am, bleeding has stopped now. No chest pain, dizziness, SOB, weakness, fatigue. On daily asa 81.

## 2019-10-29 PROBLEM — C61 MALIGNANT NEOPLASM OF PROSTATE: Chronic | Status: ACTIVE | Noted: 2019-10-27

## 2019-10-29 PROBLEM — R04.0 EPISTAXIS: Chronic | Status: ACTIVE | Noted: 2019-10-27

## 2019-10-31 ENCOUNTER — APPOINTMENT (OUTPATIENT)
Dept: OTOLARYNGOLOGY | Facility: CLINIC | Age: 84
End: 2019-10-31
Payer: MEDICARE

## 2019-10-31 VITALS
HEIGHT: 67 IN | WEIGHT: 155 LBS | SYSTOLIC BLOOD PRESSURE: 145 MMHG | HEART RATE: 69 BPM | BODY MASS INDEX: 24.33 KG/M2 | DIASTOLIC BLOOD PRESSURE: 72 MMHG

## 2019-10-31 PROCEDURE — 99213 OFFICE O/P EST LOW 20 MIN: CPT | Mod: 25

## 2019-10-31 PROCEDURE — 31238 NSL/SINS NDSC SRG NSL HEMRRG: CPT | Mod: LT

## 2019-10-31 RX ORDER — AMLODIPINE BESYLATE 5 MG/1
5 TABLET ORAL
Qty: 90 | Refills: 0 | Status: ACTIVE | COMMUNITY
Start: 2019-08-07

## 2019-10-31 RX ORDER — RAMIPRIL 10 MG/1
10 CAPSULE ORAL
Qty: 90 | Refills: 0 | Status: ACTIVE | COMMUNITY
Start: 2019-09-24

## 2019-10-31 RX ORDER — METOPROLOL SUCCINATE 25 MG/1
25 TABLET, EXTENDED RELEASE ORAL
Qty: 90 | Refills: 0 | Status: ACTIVE | COMMUNITY
Start: 2019-09-16

## 2019-10-31 RX ORDER — CEPHALEXIN 500 MG/1
500 CAPSULE ORAL
Qty: 20 | Refills: 0 | Status: ACTIVE | COMMUNITY
Start: 2019-10-27

## 2019-10-31 RX ORDER — MUPIROCIN 20 MG/G
2 OINTMENT TOPICAL
Qty: 1 | Refills: 0 | Status: ACTIVE | COMMUNITY
Start: 2019-10-31 | End: 1900-01-01

## 2019-10-31 RX ORDER — ROSUVASTATIN CALCIUM 10 MG/1
10 TABLET, FILM COATED ORAL
Qty: 90 | Refills: 0 | Status: ACTIVE | COMMUNITY
Start: 2019-10-01

## 2019-10-31 RX ORDER — LEVOTHYROXINE SODIUM 0.12 MG/1
125 TABLET ORAL
Qty: 90 | Refills: 0 | Status: ACTIVE | COMMUNITY
Start: 2019-10-01

## 2019-10-31 NOTE — ASSESSMENT
[FreeTextEntry1] : Epistaxis status post endoscopic cauterization after balloon removal \par - Will give regimen of moisturization and nasal precautions to allow cauterized area to heal and avoid further bleeding. Patient was instructed what to do in the case of another bleed\par - Mupirocin ointment to left nasal vestibule\par \par \par I personally saw and examined ARTHUR BAIRD in detail. I spoke to AIMEE Jauregui regarding the assessment and plan of care.  I preformed the procedures and I reviewed the above assessment and plan of care, and agree. I have made changes in changes in the body of the note where appropriate.\par \par

## 2019-10-31 NOTE — HISTORY OF PRESENT ILLNESS
[de-identified] : 87 y/o male with 6 day history of recurrent left sided epistaxis- was on Aspirin 81mg daily. Initially evaluated at [facility] ER on 10/25. Packed with 5.5cm balloon on 10/26, bleeding continued so was re-packed with 7.5cm balloon and Merocel on 10/27. Bleeding continued so 7.5 cm balloon was inflated more and reinforced with Surgicel. Bleeding continued so balloon was deflated, repositioned and he was then observed for 12 hours. D/C'd on 10/29 on oral abx with no re-bleeding. Notes large packing material exited right side of nose yesterday. He has hx of nosebleed requiring packing and cauterization 15 years ago.\par

## 2019-10-31 NOTE — PROCEDURE
[FreeTextEntry3] : Procedure performed: endoscopic control of epistaxis left \par Pre-op/post-op indication: Epistaxis left\par Verbal and/or written consent obtained from patient\par Telescope - Rigid glass telescope #0\par Anesthesia and/or vasoconstriction was achieved topically by usin% Lidocaine spray and Afrin\par The scope was introduced in the nasal passage between the middle and inferior turbinates to exam the inferior portion of the middle meatus and the fontanelle, as well as the maxillary ostia.  Next, the scope was passed medically and posteriorly to the middle turbinates to examine the sphenoethmoid recess and the superior turbinate region.\par Upon visualization the finders are as follows:\par Nasal Septum: sigmoidal septal deviation. Possible source of bleeding visualized on left mid septum\par Bilateral exam showed normal Mucosa of the Inferior Turbinate, Middle Turbinate, and Superior Turbinate; scant Mucous, no polyps or masses; Inferior, Super and Middle Meatus were clear\par Area of likely bleeding left mid septum cauterized with silver nitrate and layered with surgicell and bacitracin ointment\par The patient tolerated the procedure well\par \par

## 2019-11-01 ENCOUNTER — APPOINTMENT (OUTPATIENT)
Dept: OTOLARYNGOLOGY | Facility: CLINIC | Age: 84
End: 2019-11-01

## 2019-11-05 ENCOUNTER — RECORD ABSTRACTING (OUTPATIENT)
Age: 84
End: 2019-11-05

## 2019-11-07 ENCOUNTER — APPOINTMENT (OUTPATIENT)
Dept: OTOLARYNGOLOGY | Facility: CLINIC | Age: 84
End: 2019-11-07
Payer: MEDICARE

## 2019-11-07 VITALS
DIASTOLIC BLOOD PRESSURE: 76 MMHG | SYSTOLIC BLOOD PRESSURE: 115 MMHG | BODY MASS INDEX: 24.33 KG/M2 | HEART RATE: 65 BPM | WEIGHT: 155 LBS | HEIGHT: 67 IN

## 2019-11-07 PROCEDURE — 99213 OFFICE O/P EST LOW 20 MIN: CPT | Mod: 25

## 2019-11-07 PROCEDURE — 31238 NSL/SINS NDSC SRG NSL HEMRRG: CPT | Mod: LT

## 2019-11-13 NOTE — ASSESSMENT
[FreeTextEntry1] : Epistaxis status post endoscopic cauterization after balloon removal \par - Will give regimen of moisturization and nasal precautions to allow cauterized area to heal and avoid further bleeding. Patient was instructed what to do in the case of another bleed\par - Mupirocin ointment to left nasal vestibule\par hold nasonex \par \par \par \par I personally saw and examined ARTHUR BAIRD in detail. I spoke to AIMEE Jauregui regarding the assessment and plan of care.  I preformed the procedures and I reviewed the above assessment and plan of care, and agree. I have made changes in changes in the body of the note where appropriate.\par \par

## 2019-11-13 NOTE — HISTORY OF PRESENT ILLNESS
[de-identified] : 85 y/o male with 6 day history of recurrent left sided epistaxis- was on Aspirin 81mg daily. Initially evaluated at [facility] ER on 10/25. Packed with 5.5cm balloon on 10/26, bleeding continued so was re-packed with 7.5cm balloon and Merocel on 10/27. Bleeding continued so 7.5 cm balloon was inflated more and reinforced with Surgicel. Bleeding continued so balloon was deflated, repositioned and he was then observed for 12 hours. D/C'd on 10/29 on oral abx with no re-bleeding. Notes large packing material exited right side of nose yesterday. He has hx of nosebleed requiring packing and cauterization 15 years ago.\par   [FreeTextEntry1] : had some bleeding last two nights form the left, not large amounts like last time but still bothersome and packed with  some cotton \par using Nasonex \par

## 2019-11-13 NOTE — CONSULT LETTER
[FreeTextEntry1] : \par Dear Dr. WHIT SIMON \par I had the pleasure of evaluating your patient ARTHUR BAIRD, thank you for allowing us to participate in their care. please see full note detailing our visit below.\par If you have any questions, please do not hesitate to call me and I would be happy to discuss further. \par \par Jalen Norman M.D.\par Attending Physician,  \par Department of Otolaryngology - Head and Neck Surgery\par [facility] \par Office: [phone]\par Fax: [phone]\par \par

## 2019-11-19 ENCOUNTER — APPOINTMENT (OUTPATIENT)
Dept: OTOLARYNGOLOGY | Facility: CLINIC | Age: 84
End: 2019-11-19
Payer: MEDICARE

## 2019-11-19 VITALS
DIASTOLIC BLOOD PRESSURE: 67 MMHG | HEART RATE: 56 BPM | BODY MASS INDEX: 24.33 KG/M2 | HEIGHT: 67 IN | WEIGHT: 155 LBS | SYSTOLIC BLOOD PRESSURE: 114 MMHG

## 2019-11-19 PROCEDURE — 31237 NSL/SINS NDSC SURG BX POLYPC: CPT | Mod: LT

## 2019-11-19 PROCEDURE — 99213 OFFICE O/P EST LOW 20 MIN: CPT | Mod: 25

## 2019-11-19 NOTE — PROCEDURE
[FreeTextEntry3] : \par \par  [FreeTextEntry6] : Procedure performed: Nasal Endoscopy- Diagnostic\par Pre-op indication(s): nasal congestion\par Post-op indication(s): nasal congestion \par Verbal and/or written consent obtained from patient\par Anterior rhinoscopy insufficient to account for symptoms\par Scope #: 3,  flexible fiber optic telescope \par The scope was introduced in the nasal passage between the middle and inferior turbinates to exam the inferior portion of the middle meatus and the fontanelle, as well as the maxillary ostia.  Next, the scope was passed medically and posteriorly to the middle turbinates to examine the sphenoethmoid recess and the superior turbinate region.\par Upon visualization the finders are as follows:\par Nasal Septum: sigmoidal septal deviation\par Bilateral - Mucosa: lots of crusting and debris left, boggy turbinates, Mucous: scant, Polyp: not seen, Inferior Turbinate: boggy, Middle Turbinate: normal, Superior Turbinate: normal, Inferior Meatus: narrow, Middle Meatus: narrow, Super Meatus:normal, Sphenoethmoidal Recess: clear\par \par procedure - left endoscopic nasal  debridement\par Bilateral nasal cavities inspected with #0 ridged sinus endoscope. left side of nose suctioned out, no bleeding, cleared airway\par

## 2019-11-19 NOTE — HISTORY OF PRESENT ILLNESS
[de-identified] : 85 y/o male with 6 day history of recurrent left sided epistaxis- was on Aspirin 81mg daily. Initially evaluated at [facility] ER on 10/25. Packed with 5.5cm balloon on 10/26, bleeding continued so was re-packed with 7.5cm balloon and Merocel on 10/27. Bleeding continued so 7.5 cm balloon was inflated more and reinforced with Surgicel. Bleeding continued so balloon was deflated, repositioned and he was then observed for 12 hours. D/C'd on 10/29 on oral abx with no re-bleeding. Notes large packing material exited right side of nose yesterday. He has hx of nosebleed requiring packing and cauterization 15 years ago.\par bleeding cauterized last week \par no nose blowing, doing baci \par no bleeding since breathing OK\par stopped nasonex \par   [FreeTextEntry1] : \par

## 2019-11-19 NOTE — ASSESSMENT
[FreeTextEntry1] : Epistaxis status post endoscopic cauterization\par - Will give regimen of moisturization and nasal precautions to allow cauterized area to heal and avoid further bleeding. Patient was instructed what to do in the case of another bleed\par - Mupirocin ointment to left nasal vestibule\par hold nasonex \par \par \par I personally saw and examined ARTHUR BAIRD in detail. I spoke to AIMEE Jauregui regarding the assessment and plan of care.  I preformed the procedures and I reviewed the above assessment and plan of care, and agree. I have made changes in changes in the body of the note where appropriate.\par \par \par  no

## 2019-11-19 NOTE — CONSULT LETTER
[FreeTextEntry1] : \par Dear Dr. WHIT SIMON \par I had the pleasure of evaluating your patient ARTHUR BAIRD, thank you for allowing us to participate in their care. please see full note detailing our visit below.\par If you have any questions, please do not hesitate to call me and I would be happy to discuss further. \par \par Jalen Norman M.D.\par Attending Physician,  \par Department of Otolaryngology - Head and Neck Surgery\par [facility] \par Office: [phone]\par Fax: [phone]\par \par

## 2019-12-10 ENCOUNTER — APPOINTMENT (OUTPATIENT)
Dept: OTOLARYNGOLOGY | Facility: CLINIC | Age: 84
End: 2019-12-10

## 2020-01-07 ENCOUNTER — APPOINTMENT (OUTPATIENT)
Dept: OTOLARYNGOLOGY | Facility: CLINIC | Age: 85
End: 2020-01-07
Payer: MEDICARE

## 2020-01-07 VITALS
BODY MASS INDEX: 24.33 KG/M2 | DIASTOLIC BLOOD PRESSURE: 65 MMHG | WEIGHT: 155 LBS | HEIGHT: 67 IN | HEART RATE: 57 BPM | SYSTOLIC BLOOD PRESSURE: 115 MMHG

## 2020-01-07 PROCEDURE — 31237 NSL/SINS NDSC SURG BX POLYPC: CPT | Mod: 50

## 2020-01-07 PROCEDURE — 99213 OFFICE O/P EST LOW 20 MIN: CPT | Mod: 25

## 2020-01-07 NOTE — CONSULT LETTER
[FreeTextEntry1] : \par Dear Dr. WHIT SIMON \par I had the pleasure of evaluating your patient ARTHUR BAIRD, thank you for allowing us to participate in their care. please see full note detailing our visit below.\par If you have any questions, please do not hesitate to call me and I would be happy to discuss further. \par \par Jalen Norman M.D.\par Attending Physician,  \par Department of Otolaryngology - Head and Neck Surgery\par [facility] \par Office: [phone]\par Fax: [phone]\par \par

## 2020-01-07 NOTE — ASSESSMENT
[FreeTextEntry1] : Epistaxis status post endoscopic cauterization, with pinpoint perf edges freshened today, scaffolding placed\par - Will give regimen of moisturization and nasal precautions to allow cauterized area to heal and avoid further bleeding. Patient was instructed what to do in the case of another bleed\par - saline gel\par hold nasonex \par \par

## 2020-01-07 NOTE — PROCEDURE
[FreeTextEntry3] : \par \par  [FreeTextEntry6] : Procedure performed: Nasal Endoscopy- Diagnostic\par Pre-op indication(s): nasal congestion\par Post-op indication(s): nasal congestion \par Verbal and/or written consent obtained from patient\par Anterior rhinoscopy insufficient to account for symptoms\par Scope #: 3,  flexible fiber optic telescope \par The scope was introduced in the nasal passage between the middle and inferior turbinates to exam the inferior portion of the middle meatus and the fontanelle, as well as the maxillary ostia.  Next, the scope was passed medically and posteriorly to the middle turbinates to examine the sphenoethmoid recess and the superior turbinate region.\par Upon visualization the finders are as follows:\par Nasal Septum: sigmoidal septal deviation\par Bilateral - Mucosa: lots of crusting and debris left, some right anteieor septum, boggy turbinates, Mucous: scant, Polyp: not seen, Inferior Turbinate: boggy, Middle Turbinate: normal, Superior Turbinate: normal, Inferior Meatus: narrow, Middle Meatus: narrow, Super Meatus:normal, Sphenoethmoidal Recess: clear\par \par procedure - B/l endoscopic nasal debridement\par Bilateral nasal cavities inspected with #0 ridged sinus endoscope. left side of nose suctioned out, no bleeding, cleared airway\par pinpoint perf seen anterior inferior, b/l crusting debrided, edges freshed and b/l surgicell palce

## 2020-01-07 NOTE — HISTORY OF PRESENT ILLNESS
[de-identified] : 85 y/o male with 6 day history of recurrent left sided epistaxis- was on Aspirin 81mg daily. Initially evaluated at [facility] ER on 10/25. Packed with 5.5cm balloon on 10/26, bleeding continued so was re-packed with 7.5cm balloon and Merocel on 10/27. Bleeding continued so 7.5 cm balloon was inflated more and reinforced with Surgicel. Bleeding continued so balloon was deflated, repositioned and he was then observed for 12 hours. D/C'd on 10/29 on oral abx with no re-bleeding. Notes large packing material exited right side of nose yesterday. He has hx of nosebleed requiring packing and cauterization 15 years ago.Hx nasal trauma and nasal surgery\par bleeding cauterized last week \par no nose blowing, doing baci \par no bleeding since breathing OK\par stopped nasonex \par   [FreeTextEntry1] : \par no bleeding since last visit visit\par breathing ok, is getting dry, still using baci \par

## 2020-02-11 ENCOUNTER — APPOINTMENT (OUTPATIENT)
Dept: OTOLARYNGOLOGY | Facility: CLINIC | Age: 85
End: 2020-02-11
Payer: MEDICARE

## 2020-02-11 VITALS
DIASTOLIC BLOOD PRESSURE: 67 MMHG | SYSTOLIC BLOOD PRESSURE: 135 MMHG | HEART RATE: 60 BPM | HEIGHT: 64 IN | WEIGHT: 150 LBS | BODY MASS INDEX: 25.61 KG/M2

## 2020-02-11 DIAGNOSIS — R04.0 EPISTAXIS: ICD-10-CM

## 2020-02-11 DIAGNOSIS — J34.89 OTHER SPECIFIED DISORDERS OF NOSE AND NASAL SINUSES: ICD-10-CM

## 2020-02-11 PROCEDURE — 99213 OFFICE O/P EST LOW 20 MIN: CPT | Mod: 25

## 2020-02-11 PROCEDURE — 31231 NASAL ENDOSCOPY DX: CPT

## 2020-02-11 NOTE — PROCEDURE
[FreeTextEntry3] : \par \par  [FreeTextEntry6] : Procedure performed: Nasal Endoscopy- Diagnostic\par Pre-op indication(s): nasal congestion\par Post-op indication(s): nasal congestion \par Verbal and/or written consent obtained from patient\par Anterior rhinoscopy insufficient to account for symptoms\par Scope #: 3,  flexible fiber optic telescope \par The scope was introduced in the nasal passage between the middle and inferior turbinates to exam the inferior portion of the middle meatus and the fontanelle, as well as the maxillary ostia.  Next, the scope was passed medically and posteriorly to the middle turbinates to examine the sphenoethmoid recess and the superior turbinate region.\par Upon visualization the finders are as follows:\par Nasal Septum: sigmoidal septal deviation, 2 mm perf superior anterior septum, no crusting \par Bilateral - Mucosa: boggy turbinates, Mucous: scant, Polyp: not seen, Inferior Turbinate: boggy, Middle Turbinate: normal, Superior Turbinate: normal, Inferior Meatus: narrow, Middle Meatus: narrow, Super Meatus:normal, Sphenoethmoidal Recess: clear\par

## 2020-02-11 NOTE — HISTORY OF PRESENT ILLNESS
[de-identified] : 87 y/o male with 6 day history of recurrent left sided epistaxis- was on Aspirin 81mg daily. Initially evaluated at [facility] ER on 10/25. Packed with 5.5cm balloon on 10/26, bleeding continued so was re-packed with 7.5cm balloon and Merocel on 10/27. Bleeding continued so 7.5 cm balloon was inflated more and reinforced with Surgicel. Bleeding continued so balloon was deflated, repositioned and he was then observed for 12 hours. D/C'd on 10/29 on oral abx with no re-bleeding. Notes large packing material exited right side of nose yesterday. He has hx of nosebleed requiring packing and cauterization 15 years ago.Hx nasal trauma and nasal surgery\par bleeding cauterized last week \par no nose blowing, doing baci \par no bleeding since breathing OK\par stopped nasonex \par   [FreeTextEntry1] : \par no bleeding since last visit visit\par breathing good\par using ayr gel  \par

## 2020-02-11 NOTE — CONSULT LETTER
[FreeTextEntry1] : \par Dear Dr. WHIT SIMON \par I had the pleasure of evaluating your patient ARTHUR BAIRD, thank you for allowing us to participate in their care. please see full note detailing our visit below.\par If you have any questions, please do not hesitate to call me and I would be happy to discuss further. \par \par Jalen Norman M.D.\par Attending Physician,  \par Department of Otolaryngology - Head and Neck Surgery\par [facility] \par Office: [phone]\par Fax: [phone]\par \par

## 2022-01-13 NOTE — ED PROVIDER NOTE - PHYSICAL EXAMINATION
No
Gen: AAOx3, non-toxic  Head: NCAT  HEENT: Dried blood on nose and chin, no blood seeping around RR, mild blood streak in oropharynx, oral mucosa moist, normal conjunctiva  Lung: CTAB, no respiratory distress, no wheezes/rhonchi/rales B/L, speaking in full sentences  CV: RRR, no murmurs, rubs or gallops  Skin: Warm, well perfused, no rash  Psych: normal affect.   ~Allen Allen PGY2

## 2023-10-03 ENCOUNTER — APPOINTMENT (OUTPATIENT)
Dept: PHARMACY | Facility: CLINIC | Age: 88
End: 2023-10-03
Payer: SELF-PAY

## 2023-10-03 ENCOUNTER — OUTPATIENT (OUTPATIENT)
Dept: OUTPATIENT SERVICES | Facility: HOSPITAL | Age: 88
LOS: 1 days | Discharge: HOME | End: 2023-10-03

## 2023-10-03 ENCOUNTER — APPOINTMENT (OUTPATIENT)
Dept: SPEECH THERAPY | Facility: CLINIC | Age: 88
End: 2023-10-03

## 2023-10-03 DIAGNOSIS — Z95.1 PRESENCE OF AORTOCORONARY BYPASS GRAFT: Chronic | ICD-10-CM

## 2023-10-03 PROCEDURE — V5299A: CUSTOM

## 2023-10-04 DIAGNOSIS — H90.3 SENSORINEURAL HEARING LOSS, BILATERAL: ICD-10-CM

## 2025-01-30 ENCOUNTER — APPOINTMENT (OUTPATIENT)
Dept: SPEECH THERAPY | Facility: CLINIC | Age: 89
End: 2025-01-30

## 2025-04-04 ENCOUNTER — APPOINTMENT (OUTPATIENT)
Dept: SPEECH THERAPY | Facility: CLINIC | Age: 89
End: 2025-04-04

## 2025-05-28 ENCOUNTER — APPOINTMENT (OUTPATIENT)
Dept: OTOLARYNGOLOGY | Facility: CLINIC | Age: 89
End: 2025-05-28

## 2025-07-17 ENCOUNTER — APPOINTMENT (OUTPATIENT)
Dept: SPEECH THERAPY | Facility: CLINIC | Age: 89
End: 2025-07-17

## 2025-09-16 ENCOUNTER — APPOINTMENT (OUTPATIENT)
Dept: OTOLARYNGOLOGY | Facility: CLINIC | Age: 89
End: 2025-09-16

## 2025-09-16 VITALS
HEART RATE: 62 BPM | BODY MASS INDEX: 27.31 KG/M2 | HEIGHT: 64 IN | SYSTOLIC BLOOD PRESSURE: 104 MMHG | WEIGHT: 160 LBS | DIASTOLIC BLOOD PRESSURE: 66 MMHG

## 2025-09-16 DIAGNOSIS — H90.5 UNSPECIFIED SENSORINEURAL HEARING LOSS: ICD-10-CM

## 2025-09-16 DIAGNOSIS — H93.293 OTHER ABNORMAL AUDITORY PERCEPTIONS, BILATERAL: ICD-10-CM
